# Patient Record
Sex: FEMALE | Race: WHITE | NOT HISPANIC OR LATINO
[De-identification: names, ages, dates, MRNs, and addresses within clinical notes are randomized per-mention and may not be internally consistent; named-entity substitution may affect disease eponyms.]

---

## 2018-05-16 PROBLEM — Z00.00 ENCOUNTER FOR PREVENTIVE HEALTH EXAMINATION: Status: ACTIVE | Noted: 2018-05-16

## 2019-05-17 ENCOUNTER — APPOINTMENT (OUTPATIENT)
Dept: BREAST CENTER | Facility: CLINIC | Age: 34
End: 2019-05-17
Payer: COMMERCIAL

## 2019-05-17 VITALS
DIASTOLIC BLOOD PRESSURE: 80 MMHG | BODY MASS INDEX: 35.88 KG/M2 | SYSTOLIC BLOOD PRESSURE: 110 MMHG | WEIGHT: 195 LBS | HEIGHT: 62 IN | HEART RATE: 68 BPM

## 2019-05-17 DIAGNOSIS — C50.112 MALIGNANT NEOPLASM OF CENTRAL PORTION OF LEFT FEMALE BREAST: ICD-10-CM

## 2019-05-17 DIAGNOSIS — Z17.0 MALIGNANT NEOPLASM OF CENTRAL PORTION OF LEFT FEMALE BREAST: ICD-10-CM

## 2019-05-17 DIAGNOSIS — Z92.21 PERSONAL HISTORY OF ANTINEOPLASTIC CHEMOTHERAPY: ICD-10-CM

## 2019-05-17 DIAGNOSIS — C77.3 SECONDARY AND UNSPECIFIED MALIGNANT NEOPLASM OF AXILLA AND UPPER LIMB LYMPH NODES: ICD-10-CM

## 2019-05-17 PROCEDURE — 99215 OFFICE O/P EST HI 40 MIN: CPT

## 2019-05-17 RX ORDER — CLINDAMYCIN HYDROCHLORIDE 300 MG/1
300 CAPSULE ORAL
Qty: 15 | Refills: 0 | Status: ACTIVE | COMMUNITY
Start: 2019-05-01

## 2019-05-17 RX ORDER — TAMOXIFEN CITRATE 20 MG/1
20 TABLET, FILM COATED ORAL
Qty: 30 | Refills: 0 | Status: ACTIVE | COMMUNITY
Start: 2018-08-30

## 2019-05-17 RX ORDER — CEPHALEXIN 500 MG/1
500 CAPSULE ORAL
Qty: 40 | Refills: 0 | Status: ACTIVE | COMMUNITY
Start: 2018-12-28

## 2019-05-17 NOTE — REVIEW OF SYSTEMS
[Nasal Discharge] : nasal discharge [Hot Flashes] : hot flashes [Cold Intolerance] : intolerance to cold [Vaginal Discharge] : vaginal discharge [Negative] : Heme/Lymph

## 2019-05-17 NOTE — ASSESSMENT
[FreeTextEntry1] : 33 yo female with h/o left breast cancer, octavio\par cont surveillance per her med/onc\par doing well\par discussed MRI and she would like to wait until next year\par f/u 6 months\par rec PT for left arm dysfunction

## 2019-05-17 NOTE — PHYSICAL EXAM
[Normocephalic] : normocephalic [Atraumatic] : atraumatic [No Supraclavicular Adenopathy] : no supraclavicular adenopathy [Supple] : supple [No dominant masses] : no dominant masses left breast [No dominant masses] : no dominant masses in right breast  [Examined in the supine and seated position] : examined in the supine and seated position [No Axillary Lymphadenopathy] : no left axillary lymphadenopathy [No Rashes] : no rashes [No Edema] : no edema [No Ulceration] : no ulceration [Symmetrical] : symmetrical [de-identified] : s/p SSM and MARLEEN flap reconstx, reconst NAC bilaterally  no masses, incisions flat, no nodules [de-identified] : step off ledge medially

## 2019-05-17 NOTE — HISTORY OF PRESENT ILLNESS
[FreeTextEntry1] :  This is a 34 year old female s/p bilateral skin sparing mastectomies, left axillary lymph node dissection and MARLEEN flap (Dr. Shepard) on 04/24/2018. Pathology showed left invasive ductal carcinoma, DCIS, 16mm, grade 9/9, unifocal, solid, high nuclear grade, lymph vascular invasion numerous foci present, negative margins, receptors pending; 2/14 SNL macrometastases, 1/14 SNL micrometastases, largest metastatic deposit 1.1cm, Stage 2A, hannah gordon 10%; right benign breast tissue with abundant stromal lipomatosis. \par \par Patient originally referred by Dr. Shepard for a biopsy proven invasive ductal carcinoma with mucinous features of the left breast 6:00 retro with axillary metastasis. This was found to be grade 3/9. Receptors were ER+, MN + (weakly ), Her2-. She is currently undergoing neoadjuvant chemotherapy with Dr. Nathalie Lu, [place], [place]. She received ddACT she received 4/4ACT  with pegfilgastrim.  She had a reaction to 1st dose of taxol but tolerated  receiving half dose weekly.  She received 13 doses. Her last chemo tx was 3/26/2018. The patient was having nipple pain for several weeks this past fall. She then felt a mass near her left nipple in October and had imaging. This malignancy was found through ultrasound guided biopsy done on  10/19/2017 for an irregular focal asymmetry seen in the anterior third subareolar region  seen on mammogram which correlates with palpable area of concern and ultrasound findings of  a 2.1x1.6x2.3 CM irregular hypoechoic lesion left breast 6:00 (ribbon clip). Also noted on left targeted ultrasound was a left axillary node with a thickened cortex. The patient underwent core biopsy of the suspicious left lymph node (butterfly clip). Pathology was positive for metastatic carcinoma.   MRI finding showed a 1.9x2.5x2 CM enhancing mass corresponding to the area of known malignancy. At least 2 suspicious nodes were seen with one correlating to the previously biopsied positive node. The right breast showed no area of suspicion. The patient underwent a PET CT. The finding showed 2 pulmonary nodules. These findings were too small for evaluation on PET/CT criteria. Also found was a subcentimeter right renal mass which was also to minute for CT evaluation. The patient states she did undergo a genetic testing panel with [employer] and was negative. The testing was done at [facility]. \par \par \par She does SBE. \par She has noticed a change in her breast or a breast lump. left breast. \par She has not noticed a change in her nipple or nipple area.\par She has not noticed a change in the skin of the breast.\par She is not experiencing nipple discharge.\par She is not experiencing breast pain.\par She has not noticed a lump or lymph node under the armpit. \par \par BREAST CANCER RISK FACTORS\par Menarche: 17\par Date of LMP: 4/2019\par Menopause: pre\par Grav:  0    Para:  0\par Age at first live birth: n/a\par Nursed: n/a\par Hysterectomy: no\par Oophorectomy: no\par OCP: yes >10y\par HRT: no\par Last pap/pelvic exam: 11/2017 WNL \par Related family history: Pat aunt BCA@47\par Ashkenazi: no\par Mastery risk assessment: n/a\par BRCA testing:Annelise negative\par Bra size:  42D\par \par Last mammogram:   10/11/2017                           Location: [facility]\par Report reviewed.                                 Images reviewed.\par Results: Birads 4C\par Left anteriorthird retro  focal asymmetric density that correlated with area of palpable concern\par Right No suspicious findings\par \par Last ultrasound:  10/11/2017   targeted                              Location: [facility]\par Report reviewed.                                 Images reviewed. \par Results: Birads 4C\par Left anterior third subareolar 2.1x1.6x2.3CM irregular mass correlating with mammo findings. Rec u/s bx. Path: IDC\par Left axilla axillary lymph node with thickened cortex. Rec u/s bx. Path: positve for metastatic carcinoma\par \par \par Last MRI:    04/10/2018                                        Location: [place]\par Report reviewed.\par Results: Birads 6\par Left 1.9x2.5x2 CM irregular enhancing mass correlating to known malignancy\par Left axilla at least 2 morphologically abnormal lymph nodes. One corresponding to the known metastatic lymph no

## 2019-11-18 ENCOUNTER — APPOINTMENT (OUTPATIENT)
Dept: BREAST CENTER | Facility: CLINIC | Age: 34
End: 2019-11-18